# Patient Record
Sex: FEMALE | Race: WHITE | NOT HISPANIC OR LATINO | Employment: PART TIME | URBAN - METROPOLITAN AREA
[De-identification: names, ages, dates, MRNs, and addresses within clinical notes are randomized per-mention and may not be internally consistent; named-entity substitution may affect disease eponyms.]

---

## 2019-09-10 ENCOUNTER — TRANSCRIBE ORDERS (OUTPATIENT)
Dept: ADMINISTRATIVE | Facility: HOSPITAL | Age: 34
End: 2019-09-10

## 2019-09-10 DIAGNOSIS — N83.209 CYST OF OVARY, UNSPECIFIED LATERALITY: Primary | ICD-10-CM

## 2019-09-17 ENCOUNTER — HOSPITAL ENCOUNTER (OUTPATIENT)
Dept: ULTRASOUND IMAGING | Facility: HOSPITAL | Age: 34
Discharge: HOME | End: 2019-09-17
Admitting: FAMILY MEDICINE

## 2019-09-17 DIAGNOSIS — N83.209 CYST OF OVARY, UNSPECIFIED LATERALITY: ICD-10-CM

## 2019-09-17 PROCEDURE — 76856 US EXAM PELVIC COMPLETE: CPT

## 2019-09-17 PROCEDURE — 76830 TRANSVAGINAL US NON-OB: CPT

## 2022-03-01 ENCOUNTER — OFFICE VISIT (OUTPATIENT)
Dept: ORTHOPEDIC SURGERY | Facility: CLINIC | Age: 37
End: 2022-03-01

## 2022-03-01 VITALS
WEIGHT: 190 LBS | BODY MASS INDEX: 37.3 KG/M2 | HEART RATE: 87 BPM | HEIGHT: 60 IN | DIASTOLIC BLOOD PRESSURE: 85 MMHG | SYSTOLIC BLOOD PRESSURE: 125 MMHG

## 2022-03-01 DIAGNOSIS — M67.911 TENDINOPATHY OF ROTATOR CUFF, RIGHT: Primary | ICD-10-CM

## 2022-03-01 DIAGNOSIS — M75.51 SUBACROMIAL BURSITIS OF RIGHT SHOULDER JOINT: ICD-10-CM

## 2022-03-01 DIAGNOSIS — M75.21 BICEPS TENDINITIS OF RIGHT UPPER EXTREMITY: ICD-10-CM

## 2022-03-01 PROCEDURE — 99203 OFFICE O/P NEW LOW 30 MIN: CPT | Performed by: NURSE PRACTITIONER

## 2022-03-01 PROCEDURE — 73030 X-RAY EXAM OF SHOULDER: CPT | Performed by: NURSE PRACTITIONER

## 2022-03-01 RX ORDER — METHYLPREDNISOLONE 4 MG/1
TABLET ORAL
Qty: 21 TABLET | Refills: 0 | COMMUNITY
End: 2022-03-01

## 2022-03-01 RX ORDER — MELOXICAM 15 MG/1
15 TABLET ORAL DAILY
COMMUNITY
Start: 2022-02-10

## 2022-03-01 RX ORDER — METHYLPREDNISOLONE 4 MG/1
TABLET ORAL
Qty: 21 TABLET | Refills: 0 | Status: SHIPPED | OUTPATIENT
Start: 2022-03-01 | End: 2022-03-22

## 2022-03-01 RX ORDER — CYCLOBENZAPRINE HCL 10 MG
10 TABLET ORAL 3 TIMES DAILY PRN
Qty: 45 TABLET | Refills: 0 | Status: SHIPPED | OUTPATIENT
Start: 2022-03-01 | End: 2022-03-31

## 2022-03-01 NOTE — PROGRESS NOTES
Subjective:     Patient ID: Shama Sanchez is a 36 y.o. female.    Chief Complaint:  Right shoulder injury   History of Present Illness  Shama Sanchez presents today for an evaluation of her right shoulder. She has been experiencing pain for the last 3 weeks with decreased range of motion. Her pain started 1 or 2 days after moving a couch. She rates her discomfort at a 5 to 6 out of 10 on the pain scale. She describes her pain as stabbing, aching, and burning in nature. She has increased pain with working, driving, and walking. She has been taking meloxicam without any significant symptom relief. She is a . She has been participating in physical therapy at [facility] in [place] for the last 2 weeks. She has not noticed any improvement and has pain with the movements. She is currently experiencing an aching pain on the lateral aspect of her right shoulder with occasional pain at the top of her shoulder and across her clavicle. She has pain when trying to raise her arm and she is unable to raise her arm all the way up. She has occasional radiation down her arm and up into her neck. She denies any numbness or tingling.  The patient reports a fall 6 months ago. She had pain for a day or 2 and then only occasional intermittent pain since, but nothing like what she is currently experiencing. She hit the front of her right shoulder when she fell. She is unsure how she tried to catch herself because she was running with children at the time of the fall.     Social History     Occupational History   • Not on file   Tobacco Use   • Smoking status: Never Smoker   • Smokeless tobacco: Never Used   Vaping Use   • Vaping Use: Never used   Substance and Sexual Activity   • Alcohol use: Yes     Comment: occ   • Drug use: Defer   • Sexual activity: Defer      Past Medical History:   Diagnosis Date   • Depression    • Disease of thyroid gland    • Hyperlipidemia      Past Surgical History:  "  Procedure Laterality Date   • KNEE ARTHROPLASTY     • TONSILLECTOMY         History reviewed. No pertinent family history.        Objective:  Physical Exam    Vital signs reviewed.   General: No acute distress.  Eyes: conjunctiva clear; pupils equally round and reactive  ENT: external ears and nose atraumatic; oropharynx clear  CV: no peripheral edema  Resp: normal respiratory effort  Skin: no rashes or wounds; normal turgor  Psych: mood and affect appropriate; recent and remote memory intact    Vitals:    03/01/22 1001   BP: 125/85   Pulse: 87   Weight: 86.2 kg (190 lb)   Height: 152.4 cm (60\")         03/01/22  1001   Weight: 86.2 kg (190 lb)     Body mass index is 37.11 kg/m².      Right Shoulder Exam     Tenderness   The patient is experiencing tenderness in the acromion and biceps tendon.    Range of Motion   External rotation: 50   Forward flexion: 140 (passive, active 90 degrees)     Muscle Strength   Internal rotation: 4/5   External rotation: 4/5   Supraspinatus: 4/5   Subscapularis: 4/5   Biceps: 4/5     Tests   Cross arm: negative  Impingement: positive    Other   Erythema: absent  Sensation: normal  Pulse: present    Comments:  Internal rotation to side  Positive sensation light touch all distributions of the right upper extremity  Positive tenderness trapeze muscle, right side                   Imaging:  Right Shoulder X-Ray  Indication: Pain  AP Internal and External Rotation views    Findings:  No fracture  No bony lesion  Normal soft tissues  AC joint arthropathy     No prior studies were available for comparison.    Assessment:        1. Tendinopathy of rotator cuff, right    2. Biceps tendinitis of right upper extremity    3. Subacromial bursitis of right shoulder joint           Plan:  1. Discussed plan of care with patient.  Since she has come started physical therapy encouraged to proceed for range of motion.  Not improving with PT and given her 2 prior injuries not improving with " anti-inflammatory medications we will proceed with MRI to evaluate for rotator cuff tear.  Plan to see her back in clinic after completion of testing discussed results and further plan of care.  We will start Flexeril she can take at night discussed with patient may cause drowsiness encouraged to avoid driving the following day.  Can cut medication in half.  We will start Medrol Dosepak take as instructed.  Continue with ice alternating with heat to his shoulder.  Again encouraged to continue with active range of motion right upper extremity.  All questions answered.  Orders:  Orders Placed This Encounter   Procedures   • XR Shoulder 2+ View Right   • MRI Shoulder Right Without Contrast     New Medications Ordered This Visit   Medications   • cyclobenzaprine (FLEXERIL) 10 MG tablet     Sig: Take 1 tablet by mouth 3 (Three) Times a Day As Needed for Muscle Spasms.     Dispense:  45 tablet     Refill:  0   • methylPREDNISolone (MEDROL) 4 MG dose pack     Sig: Use as directed by package instructions     Dispense:  21 tablet     Refill:  0           I ordered and reviewed the PABLO today.

## 2022-03-11 ENCOUNTER — HOSPITAL ENCOUNTER (OUTPATIENT)
Dept: MRI IMAGING | Facility: HOSPITAL | Age: 37
Discharge: HOME | End: 2022-03-11
Admitting: NURSE PRACTITIONER

## 2022-03-11 DIAGNOSIS — M75.21 BICEPS TENDINITIS OF RIGHT UPPER EXTREMITY: ICD-10-CM

## 2022-03-11 DIAGNOSIS — M75.51 SUBACROMIAL BURSITIS OF RIGHT SHOULDER JOINT: ICD-10-CM

## 2022-03-11 DIAGNOSIS — M67.911 TENDINOPATHY OF ROTATOR CUFF, RIGHT: ICD-10-CM

## 2022-03-11 PROCEDURE — 73221 MRI JOINT UPR EXTREM W/O DYE: CPT

## 2022-03-22 ENCOUNTER — OFFICE VISIT (OUTPATIENT)
Dept: ORTHOPEDIC SURGERY | Facility: CLINIC | Age: 37
End: 2022-03-22

## 2022-03-22 VITALS — WEIGHT: 190 LBS | BODY MASS INDEX: 37.3 KG/M2 | HEIGHT: 60 IN

## 2022-03-22 DIAGNOSIS — M67.911 TENDINOPATHY OF ROTATOR CUFF, RIGHT: Primary | ICD-10-CM

## 2022-03-22 DIAGNOSIS — M75.21 BICEPS TENDINITIS OF RIGHT UPPER EXTREMITY: ICD-10-CM

## 2022-03-22 DIAGNOSIS — M75.51 SUBACROMIAL BURSITIS OF RIGHT SHOULDER JOINT: ICD-10-CM

## 2022-03-22 PROCEDURE — 99213 OFFICE O/P EST LOW 20 MIN: CPT | Performed by: NURSE PRACTITIONER

## 2022-03-22 PROCEDURE — 20610 DRAIN/INJ JOINT/BURSA W/O US: CPT | Performed by: NURSE PRACTITIONER

## 2022-03-22 RX ORDER — LIDOCAINE HYDROCHLORIDE 10 MG/ML
4 INJECTION, SOLUTION EPIDURAL; INFILTRATION; INTRACAUDAL; PERINEURAL
Status: COMPLETED | OUTPATIENT
Start: 2022-03-22 | End: 2022-03-22

## 2022-03-22 RX ORDER — TRIAMCINOLONE ACETONIDE 40 MG/ML
80 INJECTION, SUSPENSION INTRA-ARTICULAR; INTRAMUSCULAR
Status: COMPLETED | OUTPATIENT
Start: 2022-03-22 | End: 2022-03-22

## 2022-03-22 RX ADMIN — TRIAMCINOLONE ACETONIDE 80 MG: 40 INJECTION, SUSPENSION INTRA-ARTICULAR; INTRAMUSCULAR at 15:19

## 2022-03-22 RX ADMIN — LIDOCAINE HYDROCHLORIDE 4 ML: 10 INJECTION, SOLUTION EPIDURAL; INFILTRATION; INTRACAUDAL; PERINEURAL at 15:19

## 2022-03-22 NOTE — PROGRESS NOTES
Subjective:     Patient ID: Shama Sanchez is a 36 y.o. female.    Chief Complaint:  Follow-up rotator cuff tendinopathy, right, biceps tendinitis, right, subacromial bursitis, right shoulder  MRI completed right shoulder  History of Present Illness  Shama Sanchez returns to clinic for follow-up of her right shoulder.  Is completed the MRI presents to discuss results and further plan of care.  Continue to physical therapy twice weekly difficulty with range of motion strengthening exercises due to the pain.  She is currently taking cyclobenzaprine as needed which does help with symptom relief however is noting significant drowsiness with the medication therefore not taking on a consistent basis.  Her pain started in February approximately 1 to 2 days after she moved a couch. She rates her discomfort at a 5 to 6 out of 10 on the pain scale. She describes her pain as stabbing, aching, and burning in nature. She has increased pain with working, driving, and walking. She has been taking meloxicam without any significant symptom relief. She is a . She has been participating in physical therapy at [facility] in [place] for the last 2 weeks. She has not noticed any improvement and has pain with the movements. She is currently experiencing an aching pain on the lateral aspect of her right shoulder with occasional pain at the top of her shoulder and across her clavicle. She has pain when trying to raise her arm and she is unable to raise her arm all the way up. She has occasional radiation down her arm and up into her neck. She denies any numbness or tingling.  The patient reports a fall 6 months ago. She had pain for a day or 2 and then only occasional intermittent pain since, but nothing like what she is currently experiencing. She hit the front of her right shoulder when she fell. She is unsure how she tried to catch herself because she was running with children at the time of the  "fall.     Social History     Occupational History   • Not on file   Tobacco Use   • Smoking status: Never Smoker   • Smokeless tobacco: Never Used   Vaping Use   • Vaping Use: Never used   Substance and Sexual Activity   • Alcohol use: Yes     Comment: occ   • Drug use: Defer   • Sexual activity: Defer      Past Medical History:   Diagnosis Date   • Depression    • Disease of thyroid gland    • Hyperlipidemia      Past Surgical History:   Procedure Laterality Date   • KNEE ARTHROPLASTY     • TONSILLECTOMY         History reviewed. No pertinent family history.      Objective:  Physical Exam    General: No acute distress.  Eyes: conjunctiva clear; pupils equally round and reactive  ENT: external ears and nose atraumatic; oropharynx clear  CV: no peripheral edema  Resp: normal respiratory effort  Skin: no rashes or wounds; normal turgor  Psych: mood and affect appropriate; recent and remote memory intact    Vitals:    03/22/22 1440   Weight: 86.2 kg (190 lb)   Height: 152.4 cm (60\")         03/22/22  1440   Weight: 86.2 kg (190 lb)     Body mass index is 37.11 kg/m².      Ortho Exam     Right Shoulder Exam      Tenderness   The patient is experiencing tenderness in the acromion     Range of Motion   External rotation: 50   Forward flexion: 140 (passive, active 90 degrees)      Muscle Strength   Internal rotation: 4/5   External rotation: 4/5   Supraspinatus: 4/5   Subscapularis: 4/5   Biceps: 4/5      Tests   Cross arm: negative  Impingement: positive     Other   Erythema: absent  Sensation: normal  Pulse: present     Comments:    Internal rotation to side  Positive sensation light touch all distributions of the right upper extremity  Positive tenderness trapeze muscle, right side    Imaging:  XR Shoulder 2+ View Right    Result Date: 3/1/2022  Ordering physician's impression is located in the Encounter Note dated 03/01/22.     MRI Shoulder Right Without Contrast    Result Date: 3/13/2022  1. Mild supraspinatus and " infraspinatus tendinopathy. No rotator cuff tear. 2. No acute labral pathology. 3. Mild AC joint arthrosis which produces mild mass effect on the supraspinatus outlet, but no significant bursal inflammation. Signer Name: Mikie Espinal MD  Signed: 3/13/2022 9:44 PM  Workstation Name: [id number]  [facility]    Independently reviewed MRI right shoulder previous completed BH lag imaging available for review in chart mild supraspinatus infraspinatus tendinopathy.  No evidence of rotator cuff tear.  No acute labral pathology.  Mild AC joint arthrosis producing mass-effect on the supraspinatus outlet but no significant subacromial bursal inflammation.    Assessment:        1. Tendinopathy of rotator cuff, right    2. Biceps tendinitis of right upper extremity    3. Subacromial bursitis of right shoulder joint           Plan:  1.  Discussed treatment options with patient.  Wishes to proceed with corticosteroid injection subacromial bursa of the right shoulder.  We will plan to see her back in clinic in 4 weeks to reevaluate.  Discussed to hold off on PT for the next week is to continue with range of motion pain to see her back in 4 weeks to reevaluate.  Encouraged to call between now and that time with any questions concerns that she has.  Discussed application of ice at injection site this evening 20 minutes on 20 minutes off.  She denies all other concerns or questions that she has.  Large Joint Arthrocentesis: R subacromial bursa  Date/Time: 3/22/2022 3:19 PM  Consent given by: patient  Site marked: site marked  Timeout: Immediately prior to procedure a time out was called to verify the correct patient, procedure, equipment, support staff and site/side marked as required   Supporting Documentation  Indications: pain   Procedure Details  Location: shoulder - R subacromial bursa  Preparation: Patient was prepped and draped in the usual sterile fashion  Approach: lateral  Medications  administered: 80 mg triamcinolone acetonide 40 MG/ML; 4 mL lidocaine PF 1% 1 %  Patient tolerance: patient tolerated the procedure well with no immediate complications          Orders:  Orders Placed This Encounter   Procedures   • Large Joint Arthrocentesis: R subacromial bursa     No orders of the defined types were placed in this encounter.          I ordered and reviewed the PABLO today.

## 2022-03-30 NOTE — TELEPHONE ENCOUNTER
Rx Refill Note  Requested Prescriptions     Pending Prescriptions Disp Refills   • cyclobenzaprine (FLEXERIL) 10 MG tablet [Pharmacy Med Name: CYCLOBENZAPRINE 10 MG TABLET] 45 tablet 0     Sig: TAKE 1 TABLET BY MOUTH 3 TIMES A DAY AS NEEDED FOR MUSCLE SPASMS.      Last office visit with prescribing clinician: 3/22/2022      Next office visit with prescribing clinician: 4/19/2022   Last Filled:     No diagnosis found. Tendinopathy of rotator cuff, right     Date of Surgery:      Jaclyn Vallejo MA  03/30/22, 12:56 EDT    Previous RX pended for your approval, change or denial.     {TIP  Encounters:    {TIP  Please add Last Relevant Lab Date if appropriate:  {TIP  Is Refill Pharmacy correct?:

## 2022-03-31 RX ORDER — CYCLOBENZAPRINE HCL 10 MG
TABLET ORAL
Qty: 45 TABLET | Refills: 0 | Status: SHIPPED | OUTPATIENT
Start: 2022-03-31 | End: 2022-05-23

## 2022-04-19 ENCOUNTER — OFFICE VISIT (OUTPATIENT)
Dept: ORTHOPEDIC SURGERY | Facility: CLINIC | Age: 37
End: 2022-04-19

## 2022-04-19 VITALS — HEIGHT: 60 IN | BODY MASS INDEX: 37.3 KG/M2 | WEIGHT: 190 LBS

## 2022-04-19 DIAGNOSIS — M67.911 TENDINOPATHY OF ROTATOR CUFF, RIGHT: Primary | ICD-10-CM

## 2022-04-19 DIAGNOSIS — M75.51 SUBACROMIAL BURSITIS OF RIGHT SHOULDER JOINT: ICD-10-CM

## 2022-04-19 DIAGNOSIS — M75.21 BICEPS TENDINITIS OF RIGHT UPPER EXTREMITY: ICD-10-CM

## 2022-04-19 PROCEDURE — 99213 OFFICE O/P EST LOW 20 MIN: CPT | Performed by: NURSE PRACTITIONER

## 2022-04-19 NOTE — PROGRESS NOTES
Subjective:     Patient ID: Shama Sanchez is a 36 y.o. female.    Chief Complaint:  Follow-up rotator cuff tendinopathy, right, biceps tendinitis, right, subacromial bursitis, right shoulder, AC joint arthrosis  History of Present Illness  Shama Sanchez returns to clinic for follow-up of her right shoulder.  Received corticosteroid injection last visit 3/22/2022 subacromial bursa lateral approach as well as the meloxicam and the combination she is received approximately 90% symptom relief.  She does continue to participate with physical therapy has continued with range of motion which has made it significantly easier to complete activities after the injection.  She is just began working on a strengthening exercise of the right upper extremity with further rehab.  She forgot to take her medication today therefore is experiencing some discomfort in the shoulder.  Increased pain noted with internal rotation.  Otherwise is very pleased with symptom relief thus far.  Denies other concerns present this time.    Social History     Occupational History   • Not on file   Tobacco Use   • Smoking status: Never Smoker   • Smokeless tobacco: Never Used   Vaping Use   • Vaping Use: Never used   Substance and Sexual Activity   • Alcohol use: Yes     Comment: occ   • Drug use: Defer   • Sexual activity: Defer      Past Medical History:   Diagnosis Date   • Depression    • Disease of thyroid gland    • Hyperlipidemia      Past Surgical History:   Procedure Laterality Date   • KNEE ARTHROPLASTY     • TONSILLECTOMY         History reviewed. No pertinent family history.    Objective:  Physical Exam    General: No acute distress.  Eyes: conjunctiva clear; pupils equally round and reactive  ENT: external ears and nose atraumatic; oropharynx clear  CV: no peripheral edema  Resp: normal respiratory effort  Skin: no rashes or wounds; normal turgor  Psych: mood and affect appropriate; recent and remote memory intact    Vitals:  "   04/19/22 1442   Weight: 86.2 kg (190 lb)   Height: 152.4 cm (60\")         04/19/22  1442   Weight: 86.2 kg (190 lb)     Body mass index is 37.11 kg/m².      Ortho Exam       Right Shoulder Exam      Tenderness   The patient is experiencing tenderness in the acromion     Range of Motion   External rotation: 60  Forward flexion: 170     Muscle Strength   Internal rotation: 4/5   External rotation: 4/5   Supraspinatus: 4/5   Subscapularis: 4/5   Biceps: 4/5      Tests   Cross arm: negative  Impingement: positive     Other   Erythema: absent  Sensation: normal  Pulse: present     Comments:    Internal rotation to side however does report she has been able to complete the internal range of motion but is experiencing some discomfort today again provided medication  Positive sensation light touch all distributions of the right upper extremity    Assessment:        1. Tendinopathy of rotator cuff, right    2. Subacromial bursitis of right shoulder joint    3. Biceps tendinitis of right upper extremity           Plan:  1. Discussed plan of care with patient.  Encouraged to continue with physical therapy and see her back in clinic in 4 weeks as needed.  Again she continues to note significant improvement after the corticosteroid injection as well as the meloxicam.  All questions answered.  Orders:  No orders of the defined types were placed in this encounter.    No orders of the defined types were placed in this encounter.          I ordered and reviewed the PABLO today.           "

## 2022-05-23 RX ORDER — CYCLOBENZAPRINE HCL 10 MG
TABLET ORAL
Qty: 45 TABLET | Refills: 0 | Status: SHIPPED | OUTPATIENT
Start: 2022-05-23